# Patient Record
Sex: FEMALE | URBAN - METROPOLITAN AREA
[De-identification: names, ages, dates, MRNs, and addresses within clinical notes are randomized per-mention and may not be internally consistent; named-entity substitution may affect disease eponyms.]

---

## 2020-11-12 ENCOUNTER — APPOINTMENT (OUTPATIENT)
Dept: URBAN - METROPOLITAN AREA CLINIC 319 | Age: 40
Setting detail: DERMATOLOGY
End: 2020-11-12

## 2020-11-12 DIAGNOSIS — L64.8 OTHER ANDROGENIC ALOPECIA: ICD-10-CM

## 2020-11-12 PROCEDURE — OTHER LAB REPORTS REVIEWED: OTHER

## 2020-11-12 PROCEDURE — OTHER PRESCRIPTION: OTHER

## 2020-11-12 PROCEDURE — OTHER COUNSELING: OTHER

## 2020-11-12 PROCEDURE — 99202 OFFICE O/P NEW SF 15 MIN: CPT

## 2020-11-12 PROCEDURE — OTHER TREATMENT REGIMEN: OTHER

## 2020-11-12 PROCEDURE — OTHER MIPS QUALITY: OTHER

## 2020-11-12 RX ORDER — FINASTERIDE 1 MG/1
TABLET, FILM COATED ORAL
Qty: 30 | Refills: 6 | Status: ERX | COMMUNITY
Start: 2020-11-12

## 2020-11-12 NOTE — HPI: HAIR LOSS
Previous Labs: Yes
How Did The Hair Loss Occur?: gradual in onset
How Severe Is Your Hair Loss?: mild
What Hair Products Do You Use?: She stopped the minoxidil three weeks ago made her scalp super itchy,tender red and flaking.
Lab Details: 1 month ago labs were normal

## 2020-11-12 NOTE — PROCEDURE: TREATMENT REGIMEN
Otc Regimen: Nutrafol for women take as directed \\nTheradome laser light therapy
Plan: Patient with long standing female pattern hair loss.  We will schedule for PRP treatments at $450/treatment.  Patient says she is no longer having children thus we will also write for finasteride today. \\n\\nWe will forgo rogaine at this time given history of contact allergy to scalp with use of this product
Detail Level: Detailed

## 2020-11-13 ENCOUNTER — APPOINTMENT (OUTPATIENT)
Dept: URBAN - METROPOLITAN AREA CLINIC 319 | Age: 40
Setting detail: DERMATOLOGY
End: 2020-11-13

## 2020-11-13 DIAGNOSIS — L65.9 NONSCARRING HAIR LOSS, UNSPECIFIED: ICD-10-CM

## 2020-11-13 PROCEDURE — OTHER ADDITIONAL NOTES: OTHER

## 2020-11-13 PROCEDURE — OTHER PLATELET RICH PLASMA INJECTION: OTHER

## 2020-11-13 PROCEDURE — OTHER TREATMENT REGIMEN: OTHER

## 2020-11-13 NOTE — PROCEDURE: PLATELET RICH PLASMA INJECTION
Post-Care Instructions: **Platelet Rich Plasma (PRP) Post-Treatment Instructions**\\n\\nPlease carefully read and follow these instructions after your PRP treatment.  There are minimal restrictions after your PRP injections allowing you to return to your daily activities almost immediately. \\n\\nDO NOT touch, press, rub or manipulate the treated area(s) for at least 8 hours after your treatment.\\n\\nIf possible, AVOID Advil, Aleve, Aspirin, Ibuprofen, Motrin, Naprosyn, (all non-steroidal anti-inflammatory agents), Vitamin A, Vitamin E, Gingko Biloba, Garlic, Flax Oil, Cod Liver, Essential Fatty Acids (EPA, DHA), for at least 1 week prior to and 2 weeks after your treatment.  Remember, our goal is to create mild inflammation to stimulate hair regrowth, and these listed medications may limit inflammation which could diminish your results.  If you must take Aspirin for cardiac reasons, you certainly may do so, but this may limit your results. \\n\\nIt is normal to experience bruising, redness, itching, swelling and/or soreness that may last from 2-5 days following your procedure.  If you experience any pain or discomfort you may take Tylenol or other Acetaminophen-containing products as directed.\\n\\nWe would prefer if you could refrain from applying ice to the injected area as ice acts as an anti-inflammatory.\\n\\nDo not wet your hair or use any hair products for approximately 8 hours after your treatment.\\n\\nWait until the day after your treatment to shampoo your hair, you may use any shampoo of your choosing. \\n\\nWearing a hat the day of your treatment is fine.  Try to limit direct sun to the treated area for a few days after your treatment. \\n\\nAVOID vigorous exercise and swimming, as well as excessive heat exposure such as saunas/steam rooms for a few days after your treatment.\\n\\nAVOID alcohol, caffeine, and cigarettes for 3 days after your treatment.  Smokers do not heal well and results may take longer. \\n\\nSTART or RESUME Minoxidil 5% (Rogaine) application to your scalp both morning and night.\\n\\nSTART or RESUME using microneedle roller on areas of hair loss ONCE PER WEEK x 12 weeks.  With light pressure, roll device across scalp in 3 directions:  parallel, perpendicular, and diagonal.  Apply Minoxidil 5% (Rogaine) afterwards.\\n\\nPlease do not hesitate to call our office should you have any questions or concerns regarding your PRP treatment or aftercare.\\[phone] Post-Care Instructions: **Platelet Rich Plasma (PRP) Post-Treatment Instructions**\\n\\nPlease carefully read and follow these instructions after your PRP treatment.  There are minimal restrictions after your PRP injections allowing you to return to your daily activities almost immediately. \\n\\nDO NOT touch, press, rub or manipulate the treated area(s) for at least 8 hours after your treatment.\\n\\nIf possible, AVOID Advil, Aleve, Aspirin, Ibuprofen, Motrin, Naprosyn, (all non-steroidal anti-inflammatory agents), Vitamin A, Vitamin E, Gingko Biloba, Garlic, Flax Oil, Cod Liver, Essential Fatty Acids (EPA, DHA), for at least 1 week prior to and 2 weeks after your treatment.  Remember, our goal is to create mild inflammation to stimulate hair regrowth, and these listed medications may limit inflammation which could diminish your results.  If you must take Aspirin for cardiac reasons, you certainly may do so, but this may limit your results. \\n\\nIt is normal to experience bruising, redness, itching, swelling and/or soreness that may last from 2-5 days following your procedure.  If you experience any pain or discomfort you may take Tylenol or other Acetaminophen-containing products as directed.\\n\\nWe would prefer if you could refrain from applying ice to the injected area as ice acts as an anti-inflammatory.\\n\\nDo not wet your hair or use any hair products for approximately 8 hours after your treatment.\\n\\nWait until the day after your treatment to shampoo your hair, you may use any shampoo of your choosing. \\n\\nWearing a hat the day of your treatment is fine.  Try to limit direct sun to the treated area for a few days after your treatment. \\n\\nAVOID vigorous exercise and swimming, as well as excessive heat exposure such as saunas/steam rooms for a few days after your treatment.\\n\\nAVOID alcohol, caffeine, and cigarettes for 3 days after your treatment.  Smokers do not heal well and results may take longer. \\n\\nSTART or RESUME Minoxidil 5% (Rogaine) application to your scalp both morning and night.\\n\\nSTART or RESUME using microneedle roller on areas of hair loss ONCE PER WEEK x 12 weeks.  With light pressure, roll device across scalp in 3 directions:  parallel, perpendicular, and diagonal.  Apply Minoxidil 5% (Rogaine) afterwards.\\n\\nPlease do not hesitate to call our office should you have any questions or concerns regarding your PRP treatment or aftercare.\\[phone]

## 2020-12-17 ENCOUNTER — APPOINTMENT (OUTPATIENT)
Dept: URBAN - METROPOLITAN AREA CLINIC 319 | Age: 40
Setting detail: DERMATOLOGY
End: 2021-11-04

## 2020-12-17 DIAGNOSIS — L65.9 NONSCARRING HAIR LOSS, UNSPECIFIED: ICD-10-CM

## 2020-12-17 PROCEDURE — OTHER ADDITIONAL NOTES: OTHER

## 2020-12-17 PROCEDURE — OTHER PLATELET RICH PLASMA INJECTION: OTHER

## 2020-12-17 PROCEDURE — OTHER TREATMENT REGIMEN: OTHER

## 2020-12-17 NOTE — PROCEDURE: PLATELET RICH PLASMA INJECTION
Post-Care Instructions: **Platelet Rich Plasma (PRP) Post-Treatment Instructions**\\n\\nPlease carefully read and follow these instructions after your PRP treatment.  There are minimal restrictions after your PRP injections allowing you to return to your daily activities almost immediately. \\n\\nDO NOT touch, press, rub or manipulate the treated area(s) for at least 8 hours after your treatment.\\n\\nIf possible, AVOID Advil, Aleve, Aspirin, Ibuprofen, Motrin, Naprosyn, (all non-steroidal anti-inflammatory agents), Vitamin A, Vitamin E, Gingko Biloba, Garlic, Flax Oil, Cod Liver, Essential Fatty Acids (EPA, DHA), for at least 1 week prior to and 2 weeks after your treatment.  Remember, our goal is to create mild inflammation to stimulate hair regrowth, and these listed medications may limit inflammation which could diminish your results.  If you must take Aspirin for cardiac reasons, you certainly may do so, but this may limit your results. \\n\\nIt is normal to experience bruising, redness, itching, swelling and/or soreness that may last from 2-5 days following your procedure.  If you experience any pain or discomfort you may take Tylenol or other Acetaminophen-containing products as directed.\\n\\nWe would prefer if you could refrain from applying ice to the injected area as ice acts as an anti-inflammatory.\\n\\nDo not wet your hair or use any hair products for approximately 8 hours after your treatment.\\n\\nWait until the day after your treatment to shampoo your hair, you may use any shampoo of your choosing. \\n\\nWearing a hat the day of your treatment is fine.  Try to limit direct sun to the treated area for a few days after your treatment. \\n\\nAVOID vigorous exercise and swimming, as well as excessive heat exposure such as saunas/steam rooms for a few days after your treatment.\\n\\nAVOID alcohol, caffeine, and cigarettes for 3 days after your treatment.  Smokers do not heal well and results may take longer. \\n\\nSTART or RESUME Minoxidil 5% (Rogaine) application to your scalp both morning and night.\\n\\nSTART or RESUME using microneedle roller on areas of hair loss ONCE PER WEEK x 12 weeks.  With light pressure, roll device across scalp in 3 directions:  parallel, perpendicular, and diagonal.  Apply Minoxidil 5% (Rogaine) afterwards.\\n\\nPlease do not hesitate to call our office should you have any questions or concerns regarding your PRP treatment or aftercare.\\[phone] Post-Care Instructions: **Platelet Rich Plasma (PRP) Post-Treatment Instructions**\\n\\nPlease carefully read and follow these instructions after your PRP treatment.  There are minimal restrictions after your PRP injections allowing you to return to your daily activities almost immediately. \\n\\nDO NOT touch, press, rub or manipulate the treated area(s) for at least 8 hours after your treatment.\\n\\nIf possible, AVOID Advil, Aleve, Aspirin, Ibuprofen, Motrin, Naprosyn, (all non-steroidal anti-inflammatory agents), Vitamin A, Vitamin E, Gingko Biloba, Garlic, Flax Oil, Cod Liver, Essential Fatty Acids (EPA, DHA), for at least 1 week prior to and 2 weeks after your treatment.  Remember, our goal is to create mild inflammation to stimulate hair regrowth, and these listed medications may limit inflammation which could diminish your results.  If you must take Aspirin for cardiac reasons, you certainly may do so, but this may limit your results. \\n\\nIt is normal to experience bruising, redness, itching, swelling and/or soreness that may last from 2-5 days following your procedure.  If you experience any pain or discomfort you may take Tylenol or other Acetaminophen-containing products as directed.\\n\\nWe would prefer if you could refrain from applying ice to the injected area as ice acts as an anti-inflammatory.\\n\\nDo not wet your hair or use any hair products for approximately 8 hours after your treatment.\\n\\nWait until the day after your treatment to shampoo your hair, you may use any shampoo of your choosing. \\n\\nWearing a hat the day of your treatment is fine.  Try to limit direct sun to the treated area for a few days after your treatment. \\n\\nAVOID vigorous exercise and swimming, as well as excessive heat exposure such as saunas/steam rooms for a few days after your treatment.\\n\\nAVOID alcohol, caffeine, and cigarettes for 3 days after your treatment.  Smokers do not heal well and results may take longer. \\n\\nSTART or RESUME Minoxidil 5% (Rogaine) application to your scalp both morning and night.\\n\\nSTART or RESUME using microneedle roller on areas of hair loss ONCE PER WEEK x 12 weeks.  With light pressure, roll device across scalp in 3 directions:  parallel, perpendicular, and diagonal.  Apply Minoxidil 5% (Rogaine) afterwards.\\n\\nPlease do not hesitate to call our office should you have any questions or concerns regarding your PRP treatment or aftercare.\\[phone]

## 2020-12-17 NOTE — PROCEDURE: TREATMENT REGIMEN
Detail Level: Detailed
Initiate Treatment: Minoxidil 5% topical\\nApply a thin layer to affected areas on scalp twice daily\\nAvoid excessive sweating for 30 minutes after application

## 2021-01-15 ENCOUNTER — APPOINTMENT (OUTPATIENT)
Dept: URBAN - METROPOLITAN AREA CLINIC 319 | Age: 41
Setting detail: DERMATOLOGY
End: 2021-01-15

## 2021-01-15 DIAGNOSIS — L65.9 NONSCARRING HAIR LOSS, UNSPECIFIED: ICD-10-CM

## 2021-01-15 PROCEDURE — OTHER TREATMENT REGIMEN: OTHER

## 2021-01-15 PROCEDURE — OTHER ADDITIONAL NOTES: OTHER

## 2021-01-15 PROCEDURE — OTHER PLATELET RICH PLASMA INJECTION: OTHER

## 2021-01-15 ASSESSMENT — LOCATION DETAILED DESCRIPTION DERM: LOCATION DETAILED: LEFT SUPERIOR PARIETAL SCALP

## 2021-01-15 ASSESSMENT — LOCATION ZONE DERM: LOCATION ZONE: SCALP

## 2021-01-15 ASSESSMENT — LOCATION SIMPLE DESCRIPTION DERM: LOCATION SIMPLE: SCALP

## 2021-04-23 ENCOUNTER — APPOINTMENT (OUTPATIENT)
Dept: URBAN - METROPOLITAN AREA CLINIC 319 | Age: 41
Setting detail: DERMATOLOGY
End: 2021-04-23

## 2021-04-23 ENCOUNTER — RX ONLY (RX ONLY)
Age: 41
End: 2021-04-23

## 2021-04-23 DIAGNOSIS — L65.9 NONSCARRING HAIR LOSS, UNSPECIFIED: ICD-10-CM

## 2021-04-23 PROCEDURE — OTHER TREATMENT REGIMEN: OTHER

## 2021-04-23 PROCEDURE — OTHER ADDITIONAL NOTES: OTHER

## 2021-04-23 PROCEDURE — OTHER PLATELET RICH PLASMA INJECTION: OTHER

## 2021-04-23 RX ORDER — FINASTERIDE 1 MG/1
TABLET, FILM COATED ORAL
Qty: 30 | Refills: 6 | Status: ERX

## 2021-04-23 NOTE — PROCEDURE: PLATELET RICH PLASMA INJECTION
Consent: Written consent obtained, risks reviewed including but not limited to pain, scarring, infection and incomplete improvement.  Patient understands the procedure is cosmetic in nature and will require out of pocket payment.\\n-\\n-\\n

## 2021-04-23 NOTE — PROCEDURE: PLATELET RICH PLASMA INJECTION
Post-Care Instructions: **Platelet Rich Plasma (PRP) Post-Treatment Instructions**\\n\\nPlease carefully read and follow these instructions after your PRP treatment.  There are minimal restrictions after your PRP injections allowing you to return to your daily activities almost immediately. \\n\\nDO NOT touch, press, rub or manipulate the treated area(s) for at least 8 hours after your treatment.\\n\\nIf possible, AVOID Advil, Aleve, Aspirin, Ibuprofen, Motrin, Naprosyn, (all non-steroidal anti-inflammatory agents), Vitamin A, Vitamin E, Gingko Biloba, Garlic, Flax Oil, Cod Liver, Essential Fatty Acids (EPA, DHA), for at least 1 week prior to and 2 weeks after your treatment.  Remember, our goal is to create mild inflammation to stimulate hair regrowth, and these listed medications may limit inflammation which could diminish your results.  If you must take Aspirin for cardiac reasons, you certainly may do so, but this may limit your results. \\n\\nIt is normal to experience bruising, redness, itching, swelling and/or soreness that may last from 2-5 days following your procedure.  If you experience any pain or discomfort you may take Tylenol or other Acetaminophen-containing products as directed.\\n\\nWe would prefer if you could refrain from applying ice to the injected area as ice acts as an anti-inflammatory.\\n\\nDo not wet your hair or use any hair products for approximately 8 hours after your treatment.\\n\\nWait until the day after your treatment to shampoo your hair, you may use any shampoo of your choosing. \\n\\nWearing a hat the day of your treatment is fine.  Try to limit direct sun to the treated area for a few days after your treatment. \\n\\nAVOID vigorous exercise and swimming, as well as excessive heat exposure such as saunas/steam rooms for a few days after your treatment.\\n\\nAVOID alcohol, caffeine, and cigarettes for 3 days after your treatment.  Smokers do not heal well and results may take longer. \\n\\nSTART or RESUME Minoxidil 5% (Rogaine) application to your scalp both morning and night.\\n\\nSTART or RESUME using microneedle roller on areas of hair loss ONCE PER WEEK x 12 weeks.  With light pressure, roll device across scalp in 3 directions:  parallel, perpendicular, and diagonal.  Apply Minoxidil 5% (Rogaine) afterwards.\\n\\nPlease do not hesitate to call our office should you have any questions or concerns regarding your PRP treatment or aftercare.\\[phone] Post-Care Instructions: **Platelet Rich Plasma (PRP) Post-Treatment Instructions**\\n\\nPlease carefully read and follow these instructions after your PRP treatment.  There are minimal restrictions after your PRP injections allowing you to return to your daily activities almost immediately. \\n\\nDO NOT touch, press, rub or manipulate the treated area(s) for at least 8 hours after your treatment.\\n\\nIf possible, AVOID Advil, Aleve, Aspirin, Ibuprofen, Motrin, Naprosyn, (all non-steroidal anti-inflammatory agents), Vitamin A, Vitamin E, Gingko Biloba, Garlic, Flax Oil, Cod Liver, Essential Fatty Acids (EPA, DHA), for at least 1 week prior to and 2 weeks after your treatment.  Remember, our goal is to create mild inflammation to stimulate hair regrowth, and these listed medications may limit inflammation which could diminish your results.  If you must take Aspirin for cardiac reasons, you certainly may do so, but this may limit your results. \\n\\nIt is normal to experience bruising, redness, itching, swelling and/or soreness that may last from 2-5 days following your procedure.  If you experience any pain or discomfort you may take Tylenol or other Acetaminophen-containing products as directed.\\n\\nWe would prefer if you could refrain from applying ice to the injected area as ice acts as an anti-inflammatory.\\n\\nDo not wet your hair or use any hair products for approximately 8 hours after your treatment.\\n\\nWait until the day after your treatment to shampoo your hair, you may use any shampoo of your choosing. \\n\\nWearing a hat the day of your treatment is fine.  Try to limit direct sun to the treated area for a few days after your treatment. \\n\\nAVOID vigorous exercise and swimming, as well as excessive heat exposure such as saunas/steam rooms for a few days after your treatment.\\n\\nAVOID alcohol, caffeine, and cigarettes for 3 days after your treatment.  Smokers do not heal well and results may take longer. \\n\\nSTART or RESUME Minoxidil 5% (Rogaine) application to your scalp both morning and night.\\n\\nSTART or RESUME using microneedle roller on areas of hair loss ONCE PER WEEK x 12 weeks.  With light pressure, roll device across scalp in 3 directions:  parallel, perpendicular, and diagonal.  Apply Minoxidil 5% (Rogaine) afterwards.\\n\\nPlease do not hesitate to call our office should you have any questions or concerns regarding your PRP treatment or aftercare.\\[phone]